# Patient Record
Sex: FEMALE | Race: WHITE | HISPANIC OR LATINO | Employment: UNEMPLOYED | URBAN - METROPOLITAN AREA
[De-identification: names, ages, dates, MRNs, and addresses within clinical notes are randomized per-mention and may not be internally consistent; named-entity substitution may affect disease eponyms.]

---

## 2023-08-07 ENCOUNTER — OFFICE VISIT (OUTPATIENT)
Dept: FAMILY MEDICINE CLINIC | Facility: CLINIC | Age: 28
End: 2023-08-07
Payer: MEDICAID

## 2023-08-07 VITALS
HEIGHT: 61 IN | DIASTOLIC BLOOD PRESSURE: 60 MMHG | WEIGHT: 179 LBS | RESPIRATION RATE: 14 BRPM | SYSTOLIC BLOOD PRESSURE: 112 MMHG | HEART RATE: 87 BPM | TEMPERATURE: 96.9 F | OXYGEN SATURATION: 96 % | BODY MASS INDEX: 33.79 KG/M2

## 2023-08-07 DIAGNOSIS — J45.20 MILD INTERMITTENT ASTHMA WITHOUT COMPLICATION: ICD-10-CM

## 2023-08-07 DIAGNOSIS — Z11.59 NEED FOR HEPATITIS C SCREENING TEST: ICD-10-CM

## 2023-08-07 DIAGNOSIS — Z00.00 ANNUAL PHYSICAL EXAM: Primary | ICD-10-CM

## 2023-08-07 DIAGNOSIS — A60.00 HERPES SIMPLEX INFECTION OF GENITOURINARY SYSTEM: ICD-10-CM

## 2023-08-07 DIAGNOSIS — Z13.220 LIPID SCREENING: ICD-10-CM

## 2023-08-07 PROCEDURE — 99204 OFFICE O/P NEW MOD 45 MIN: CPT | Performed by: NURSE PRACTITIONER

## 2023-08-07 PROCEDURE — 1159F MED LIST DOCD IN RCRD: CPT | Performed by: NURSE PRACTITIONER

## 2023-08-07 PROCEDURE — 1160F RVW MEDS BY RX/DR IN RCRD: CPT | Performed by: NURSE PRACTITIONER

## 2023-08-07 RX ORDER — FLUTICASONE PROPIONATE AND SALMETEROL 250; 50 UG/1; UG/1
1 POWDER RESPIRATORY (INHALATION)
Qty: 90 EACH | Refills: 1 | Status: SHIPPED | OUTPATIENT
Start: 2023-08-07

## 2023-08-07 RX ORDER — ALBUTEROL SULFATE 90 UG/1
2 AEROSOL, METERED RESPIRATORY (INHALATION) EVERY 4 HOURS PRN
Qty: 6.7 G | Refills: 12 | Status: SHIPPED | OUTPATIENT
Start: 2023-08-07

## 2023-08-07 RX ORDER — VALACYCLOVIR HYDROCHLORIDE 500 MG/1
TABLET, FILM COATED ORAL
Qty: 18 TABLET | Refills: 1 | Status: SHIPPED | OUTPATIENT
Start: 2023-08-07

## 2023-08-07 NOTE — PROGRESS NOTES
Subjective   Angie Wright is a 27 y.o. female. To est care hx loss of menses asthma med eval refills     History of Present Illness  LMP 8/2/23 - menstruation began after using birth control.    The following portions of the patient's history were reviewed and updated as appropriate: allergies, current medications, past family history, past medical history, past social history, past surgical history, and problem list.    Maternal Grandmother breast cancer survivor  Family history of diabetes    Review of Systems   HENT: Negative.     Eyes: Negative.    Respiratory: Negative.     Cardiovascular: Negative.    Gastrointestinal: Negative.    Endocrine: Negative.    Genitourinary:  Positive for genital sores (HX HERPES) and menstrual problem.   Musculoskeletal: Negative.    Skin: Negative.    Allergic/Immunologic: Negative.    Neurological: Negative.    Hematological: Negative.    Psychiatric/Behavioral:  The patient is nervous/anxious.      Objective   Physical Exam  Constitutional:       Appearance: Normal appearance.   HENT:      Head: Normocephalic and atraumatic.      Mouth/Throat:      Mouth: Mucous membranes are moist.   Eyes:      Pupils: Pupils are equal, round, and reactive to light.   Cardiovascular:      Rate and Rhythm: Normal rate and regular rhythm.      Pulses: Normal pulses.      Heart sounds: Normal heart sounds.   Pulmonary:      Effort: Pulmonary effort is normal.      Breath sounds: Normal breath sounds.   Abdominal:      General: Bowel sounds are normal.   Musculoskeletal:         General: Normal range of motion.   Skin:     General: Skin is warm and dry.      Capillary Refill: Capillary refill takes less than 2 seconds.   Neurological:      General: No focal deficit present.      Mental Status: She is alert.   Psychiatric:         Mood and Affect: Mood normal.       Active Herpes simplex genitalis outbreak this visit.  Angie reports she uses Valacyclovir 500 mg BID during her  outbreaks.            Assessment & Plan   Diagnoses and all orders for this visit:    1. Annual physical exam (Primary)  -     Hemoglobin A1c  -     CBC & Differential  -     Comprehensive Metabolic Panel    2. Mild intermittent asthma without complication  -     albuterol sulfate  (90 Base) MCG/ACT inhaler; Inhale 2 puffs Every 4 (Four) Hours As Needed for Wheezing or Shortness of Air.  Dispense: 6.7 g; Refill: 12  -     Fluticasone-Salmeterol (ADVAIR/WIXELA) 250-50 MCG/ACT DISKUS; Inhale 1 puff 2 (Two) Times a Day.  Dispense: 90 each; Refill: 1    3. Lipid screening  -     Lipid Panel    4. Need for hepatitis C screening test  -     HCV Antibody Rfx To Qnt PCR    5. Herpes simplex infection of genitourinary system  -     valACYclovir (VALTREX) 500 MG tablet; Take 1 tab twice daily for three days when suffering outbreak.  Dispense: 18 tablet; Refill: 1      Discussed Care Gaps, ordered referrals and encouraged vaccination updates.       - Pt agrees with plan of care and denies further questions/concerns today  - This document is intended for medical expert use only. Persons  reading this document without medical staff guidance may result in misinterpretation and unintended morbidity     Go to the ER for any possible life-threatening symptoms such as chest pain or shortness of air.      Please allow 3-5 business days for recommendations based on new results      I personally spent time with this patient, preparing for the visit, reviewing tests, obtaining and/or reviewing a separately obtained history, performing a medically appropriate examination and/or evaluation, counseling and educating the patient/family/caregiver, ordering medications,  documenting information in the medical record and indepentently interpreting results.         Return in about 6 months (around 2/7/2024) for Recheck. Up

## 2023-08-08 LAB
ALBUMIN SERPL-MCNC: 4.2 G/DL (ref 4–5)
ALBUMIN/GLOB SERPL: 1.7 {RATIO} (ref 1.2–2.2)
ALP SERPL-CCNC: 93 IU/L (ref 44–121)
ALT SERPL-CCNC: 12 IU/L (ref 0–32)
AST SERPL-CCNC: 11 IU/L (ref 0–40)
BASOPHILS # BLD AUTO: 0 X10E3/UL (ref 0–0.2)
BASOPHILS NFR BLD AUTO: 0 %
BILIRUB SERPL-MCNC: 0.3 MG/DL (ref 0–1.2)
BUN SERPL-MCNC: 7 MG/DL (ref 6–20)
BUN/CREAT SERPL: 11 (ref 9–23)
CALCIUM SERPL-MCNC: 9.2 MG/DL (ref 8.7–10.2)
CHLORIDE SERPL-SCNC: 107 MMOL/L (ref 96–106)
CHOLEST SERPL-MCNC: 200 MG/DL (ref 100–199)
CO2 SERPL-SCNC: 22 MMOL/L (ref 20–29)
CREAT SERPL-MCNC: 0.64 MG/DL (ref 0.57–1)
EGFRCR SERPLBLD CKD-EPI 2021: 125 ML/MIN/1.73
EOSINOPHIL # BLD AUTO: 0.1 X10E3/UL (ref 0–0.4)
EOSINOPHIL NFR BLD AUTO: 2 %
ERYTHROCYTE [DISTWIDTH] IN BLOOD BY AUTOMATED COUNT: 13.8 % (ref 11.7–15.4)
GLOBULIN SER CALC-MCNC: 2.5 G/DL (ref 1.5–4.5)
GLUCOSE SERPL-MCNC: 96 MG/DL (ref 70–99)
HBA1C MFR BLD: 5.4 % (ref 4.8–5.6)
HCT VFR BLD AUTO: 36.1 % (ref 34–46.6)
HCV AB SERPL QL IA: NORMAL
HCV IGG SERPL QL IA: NON REACTIVE
HDLC SERPL-MCNC: 42 MG/DL
HGB BLD-MCNC: 12.1 G/DL (ref 11.1–15.9)
IMM GRANULOCYTES # BLD AUTO: 0 X10E3/UL (ref 0–0.1)
IMM GRANULOCYTES NFR BLD AUTO: 0 %
LDLC SERPL CALC-MCNC: 125 MG/DL (ref 0–99)
LYMPHOCYTES # BLD AUTO: 1.6 X10E3/UL (ref 0.7–3.1)
LYMPHOCYTES NFR BLD AUTO: 24 %
MCH RBC QN AUTO: 28.7 PG (ref 26.6–33)
MCHC RBC AUTO-ENTMCNC: 33.5 G/DL (ref 31.5–35.7)
MCV RBC AUTO: 86 FL (ref 79–97)
MONOCYTES # BLD AUTO: 0.4 X10E3/UL (ref 0.1–0.9)
MONOCYTES NFR BLD AUTO: 5 %
NEUTROPHILS # BLD AUTO: 4.7 X10E3/UL (ref 1.4–7)
NEUTROPHILS NFR BLD AUTO: 69 %
PLATELET # BLD AUTO: 289 X10E3/UL (ref 150–450)
POTASSIUM SERPL-SCNC: 4.2 MMOL/L (ref 3.5–5.2)
PROT SERPL-MCNC: 6.7 G/DL (ref 6–8.5)
RBC # BLD AUTO: 4.21 X10E6/UL (ref 3.77–5.28)
SODIUM SERPL-SCNC: 142 MMOL/L (ref 134–144)
TRIGL SERPL-MCNC: 186 MG/DL (ref 0–149)
VLDLC SERPL CALC-MCNC: 33 MG/DL (ref 5–40)
WBC # BLD AUTO: 6.8 X10E3/UL (ref 3.4–10.8)

## 2023-08-08 NOTE — PROGRESS NOTES
Angie,    Your CMP looks fine, the one elevated number is just slightly elevated and we will continue to watch this.  Your Total Cholesterol is 200  Triglycerides are 186  LDL is 125  All of these numbers would improve with a low fat diet, no processed foods, no fast foods, with the addition of 45 minutes of exercise 5 days a week.  It does not have to be strenuous - and could be as simple as walking 45 minutes a day.  Your HgbA1C is 5.4 which is within normal range.  You did not react to Hep C, which means you have not been exposed.  Your labs are not terrible, just work on the recommendation and we will see you in 6 months unless you need us sooner.

## 2023-09-20 ENCOUNTER — OFFICE VISIT (OUTPATIENT)
Dept: FAMILY MEDICINE CLINIC | Facility: CLINIC | Age: 28
End: 2023-09-20
Payer: MEDICAID

## 2023-09-20 VITALS
HEART RATE: 64 BPM | DIASTOLIC BLOOD PRESSURE: 72 MMHG | WEIGHT: 176 LBS | OXYGEN SATURATION: 98 % | SYSTOLIC BLOOD PRESSURE: 110 MMHG | BODY MASS INDEX: 33.23 KG/M2 | HEIGHT: 61 IN

## 2023-09-20 DIAGNOSIS — N63.21 MASS OF UPPER OUTER QUADRANT OF LEFT BREAST: Primary | ICD-10-CM

## 2023-09-20 PROCEDURE — 99213 OFFICE O/P EST LOW 20 MIN: CPT | Performed by: NURSE PRACTITIONER

## 2023-09-20 NOTE — PROGRESS NOTES
"Subjective   Angie has lumps in her left breast - she has family history of breast cancer.  Angie Wright is a 27 y.o. female.  Chief Complaint   Patient presents with    Breast Mass     Left  Under armpit  Has stopped breast feeding about 4 months ago       History Of Present Illness  Angie for about 3 weeks - she thought she might have a clogged duct, but she has used warm compresses without relief.      There is no problem list on file for this patient.      Social History     Socioeconomic History    Marital status:    Tobacco Use    Smoking status: Never    Smokeless tobacco: Never   Vaping Use    Vaping Use: Some days    Substances: Nicotine, Flavoring    Devices: Refindicoble tank   Substance and Sexual Activity    Alcohol use: Yes     Comment: occ    Drug use: Never    Sexual activity: Yes     Partners: Male     Birth control/protection: None       Allergies   Allergen Reactions    Oxycodone Rash    Penicillins Rash    Pineapple Flavor Rash       Current Outpatient Medications on File Prior to Visit   Medication Sig Dispense Refill    albuterol sulfate  (90 Base) MCG/ACT inhaler Inhale 2 puffs Every 4 (Four) Hours As Needed for Wheezing or Shortness of Air. 6.7 g 12    Fluticasone-Salmeterol (ADVAIR/WIXELA) 250-50 MCG/ACT DISKUS Inhale 1 puff 2 (Two) Times a Day. 90 each 1    valACYclovir (VALTREX) 500 MG tablet Take 1 tab twice daily for three days when suffering outbreak. (Patient not taking: Reported on 9/20/2023) 18 tablet 1    [DISCONTINUED] norethindrone-ethinyl estradiol (Ortho-Novum 1/35, 28,) 1-35 MG-MCG per tablet Take 1 tablet by mouth Daily. (Patient not taking: Reported on 9/20/2023) 28 tablet 0     No current facility-administered medications on file prior to visit.       Objective   Vitals:    09/20/23 1117   BP: 110/72   Pulse: 64   SpO2: 98%   Weight: 79.8 kg (176 lb)   Height: 154.9 cm (61\")     Body mass index is 33.25 kg/m².    Physical Exam  Constitutional:       " Appearance: Normal appearance.   HENT:      Head: Normocephalic and atraumatic.      Mouth/Throat:      Mouth: Mucous membranes are moist.   Eyes:      Pupils: Pupils are equal, round, and reactive to light.   Cardiovascular:      Rate and Rhythm: Normal rate and regular rhythm.      Pulses: Normal pulses.      Heart sounds: Normal heart sounds.   Pulmonary:      Effort: Pulmonary effort is normal.      Breath sounds: Normal breath sounds.   Abdominal:      General: Bowel sounds are normal.   Musculoskeletal:         General: Normal range of motion.   Skin:     General: Skin is warm and dry.      Capillary Refill: Capillary refill takes less than 2 seconds.   Neurological:      General: No focal deficit present.      Mental Status: She is alert.   Psychiatric:         Mood and Affect: Mood normal.         Procedures     Assessment & Plan   Diagnoses and all orders for this visit:    1. Mass of upper outer quadrant of left breast (Primary)           BMI is >= 30 and <35. (Class 1 Obesity). The following options were offered after discussion;: weight loss educational material (shared in after visit summary), exercise counseling/recommendations, and nutrition counseling/recommendations    BMI is >= 30 and <35. (Class 1 Obesity). The following options were offered after discussion;: weight loss educational material (shared in after visit summary), exercise counseling/recommendations, and nutrition counseling/recommendations  Discussed Care Gaps, ordered referrals and encouraged vaccination updates.       - Pt agrees with plan of care and denies further questions/concerns today  - This document is intended for medical expert use only. Persons  reading this document without medical staff guidance may result in misinterpretation and unintended morbidity     Go to the ER for any possible life-threatening symptoms such as chest pain or shortness of air.      Please allow 3-5 business days for recommendations based on new  results      I personally spent time with this patient, preparing for the visit, reviewing tests, obtaining and/or reviewing a separately obtained history, performing a medically appropriate examination and/or evaluation, counseling and educating the patient/family/caregiver, ordering medications,  documenting information in the medical record and indepentently interpreting results.          Return in about 6 months (around 3/20/2024) for Next scheduled follow up.

## 2023-09-25 ENCOUNTER — TELEPHONE (OUTPATIENT)
Dept: FAMILY MEDICINE CLINIC | Facility: CLINIC | Age: 28
End: 2023-09-25

## 2023-09-25 DIAGNOSIS — N64.4 BREAST TENDERNESS: Primary | ICD-10-CM
